# Patient Record
Sex: MALE | URBAN - METROPOLITAN AREA
[De-identification: names, ages, dates, MRNs, and addresses within clinical notes are randomized per-mention and may not be internally consistent; named-entity substitution may affect disease eponyms.]

---

## 2022-09-22 ENCOUNTER — HOSPITAL ENCOUNTER (EMERGENCY)
Facility: MEDICAL CENTER | Age: 22
End: 2022-09-23
Attending: EMERGENCY MEDICINE

## 2022-09-22 ENCOUNTER — APPOINTMENT (OUTPATIENT)
Dept: RADIOLOGY | Facility: MEDICAL CENTER | Age: 22
End: 2022-09-22
Attending: EMERGENCY MEDICINE

## 2022-09-22 DIAGNOSIS — S82.892A CLOSED FRACTURE OF LEFT ANKLE, INITIAL ENCOUNTER: Primary | ICD-10-CM

## 2022-09-22 DIAGNOSIS — S93.05XA DISLOCATION OF LEFT ANKLE JOINT, INITIAL ENCOUNTER: ICD-10-CM

## 2022-09-22 PROCEDURE — 73600 X-RAY EXAM OF ANKLE: CPT | Mod: LT

## 2022-09-22 PROCEDURE — 99285 EMERGENCY DEPT VISIT HI MDM: CPT

## 2022-09-22 RX ORDER — SODIUM CHLORIDE 9 MG/ML
500 INJECTION, SOLUTION INTRAVENOUS
Status: CANCELLED | OUTPATIENT
Start: 2022-09-22 | End: 2022-09-23

## 2022-09-22 RX ORDER — PROPOFOL 10 MG/ML
200 INJECTION, EMULSION INTRAVENOUS ONCE
Status: COMPLETED | OUTPATIENT
Start: 2022-09-23 | End: 2022-09-23

## 2022-09-22 RX ORDER — ACETAMINOPHEN 500 MG
1000 TABLET ORAL ONCE
Status: COMPLETED | OUTPATIENT
Start: 2022-09-22 | End: 2022-09-23

## 2022-09-23 ENCOUNTER — APPOINTMENT (OUTPATIENT)
Dept: RADIOLOGY | Facility: MEDICAL CENTER | Age: 22
End: 2022-09-23
Attending: EMERGENCY MEDICINE

## 2022-09-23 VITALS
RESPIRATION RATE: 23 BRPM | TEMPERATURE: 98 F | WEIGHT: 148 LBS | BODY MASS INDEX: 21.92 KG/M2 | OXYGEN SATURATION: 100 % | SYSTOLIC BLOOD PRESSURE: 125 MMHG | HEART RATE: 71 BPM | DIASTOLIC BLOOD PRESSURE: 77 MMHG | HEIGHT: 69 IN

## 2022-09-23 PROCEDURE — 27840 TREAT ANKLE DISLOCATION: CPT

## 2022-09-23 PROCEDURE — 700102 HCHG RX REV CODE 250 W/ 637 OVERRIDE(OP): Performed by: EMERGENCY MEDICINE

## 2022-09-23 PROCEDURE — 73610 X-RAY EXAM OF ANKLE: CPT | Mod: LT

## 2022-09-23 PROCEDURE — 99152 MOD SED SAME PHYS/QHP 5/>YRS: CPT

## 2022-09-23 PROCEDURE — A9270 NON-COVERED ITEM OR SERVICE: HCPCS | Performed by: EMERGENCY MEDICINE

## 2022-09-23 PROCEDURE — 700111 HCHG RX REV CODE 636 W/ 250 OVERRIDE (IP): Performed by: EMERGENCY MEDICINE

## 2022-09-23 RX ORDER — PROPOFOL 10 MG/ML
INJECTION, EMULSION INTRAVENOUS
Status: COMPLETED | OUTPATIENT
Start: 2022-09-23 | End: 2022-09-23

## 2022-09-23 RX ADMIN — PROPOFOL 200 MG: 10 INJECTION, EMULSION INTRAVENOUS at 00:00

## 2022-09-23 RX ADMIN — ACETAMINOPHEN 1000 MG: 500 TABLET ORAL at 01:00

## 2022-09-23 RX ADMIN — PROPOFOL 20 MG: 10 INJECTION, EMULSION INTRAVENOUS at 00:21

## 2022-09-23 RX ADMIN — PROPOFOL 20 MG: 10 INJECTION, EMULSION INTRAVENOUS at 00:28

## 2022-09-23 RX ADMIN — PROPOFOL 20 MG: 10 INJECTION, EMULSION INTRAVENOUS at 00:23

## 2022-09-23 RX ADMIN — IBUPROFEN 800 MG: 600 TABLET ORAL at 00:59

## 2022-09-23 RX ADMIN — PROPOFOL 20 MG: 10 INJECTION, EMULSION INTRAVENOUS at 00:22

## 2022-09-23 RX ADMIN — PROPOFOL 50 MG: 10 INJECTION, EMULSION INTRAVENOUS at 00:30

## 2022-09-23 RX ADMIN — PROPOFOL 30 MG: 10 INJECTION, EMULSION INTRAVENOUS at 00:25

## 2022-09-23 RX ADMIN — PROPOFOL 40 MG: 10 INJECTION, EMULSION INTRAVENOUS at 00:20

## 2022-09-23 NOTE — ED TRIAGE NOTES
"Chief Complaint   Patient presents with    Ankle Injury     Pt states he was skating and tried to catch himself of his skateboard resulting in a L ankle injury. Pt state immediately after his fall his foot was \"facing the wrong way with toes toward the back so I twisted it back into placed.\" Obvious deformity to L ankle, pulses intact, CMS intact.       Wheelchair with father to triage for above complaint.   Educated on triage process, encourage to inform staff of any changes.     BP (!) 93/60   Pulse 65   Temp 37 °C (98.6 °F) (Temporal)   Resp 18   Ht 1.753 m (5' 9\")   Wt 67.1 kg (148 lb)   SpO2 100%   BMI 21.86 kg/m²     "

## 2022-09-23 NOTE — ED NOTES
Pt brought from lobby to [facility] via wheelchair with dad. Pt placed in hospital gown and is now sitting up in bed with even and unlabored breaths, in no apparent distress at this time. Will continue to monitor pt while awaiting orders.

## 2022-09-23 NOTE — ED NOTES
Report given to Cayetano SANCHEZ RN. At this time pt is resting in bed talking to dad at bedside with even and unlabored breaths, in no acute distress.

## 2022-09-23 NOTE — DISCHARGE INSTRUCTIONS
I want you to keep your ankle elevated.  Please take 1000 mg of Tylenol 3 times a day and 800 mg of Motrin 3 times a day.  You can use the crutches to get around.  You need to call the orthopedic doctor whose name and number I gave you above and make an appointment to be seen so that they can can consider whether you need surgery or not.  If you have any concerns or worsening pain, come back to the ED for further evaluation and treatment.  Thank you for coming in today.

## 2022-09-23 NOTE — ED PROVIDER NOTES
"ED Provider Note    Scribed for Kumar Amaya by Jeremias Cuevas. 9/22/2022  10:54 PM    Primary care provider: None noted  Means of arrival: walk in  History obtained from: Patient  History limited by: None    CHIEF COMPLAINT  Chief Complaint   Patient presents with    Ankle Injury     Pt states he was skating and tried to catch himself of his skateboard resulting in a L ankle injury. Pt state immediately after his fall his foot was \"facing the wrong way with toes toward the back so I twisted it back into placed.\" Obvious deformity to L ankle, pulses intact, CMS intact.     HPI  Ruddy Rose is a 22 y.o. male who presents to the Emergency Department for left ankle pain onset tonight. He states that he was skateboarding when he fell and his ankle turned to the left. He notes that after the injury his ankle was \"facing the wrong way\" and so he twisted it back into place. He states he has no prior injury of this severity to his ankle. He is requesting pain medications for treatment.    Quality: stabbing  Duration: two hours  Severity: moderate  Associated sx: none    REVIEW OF SYSTEMS  As above, all other systems reviewed and are negative.   See HPI for further details.     PAST MEDICAL HISTORY     SURGICAL HISTORY  patient denies any surgical history  SOCIAL HISTORY      Social History     Substance and Sexual Activity   Drug Use None noted     FAMILY HISTORY  None noted  CURRENT MEDICATIONS  Home Medications       Reviewed by Adele Kaufman R.N. (Registered Nurse) on 09/22/22 at 2221  Med List Status: Not Addressed     Medication Last Dose Status        Patient Arturo Taking any Medications                         ALLERGIES  No Known Allergies    PHYSICAL EXAM    VITAL SIGNS:   Vitals:    09/23/22 0025 09/23/22 0030 09/23/22 0035 09/23/22 0100   BP: 112/58 112/55 122/69 125/77   Pulse: 87 72 79 71   Resp: (!) 24 15  (!) 23   Temp:       TempSrc:       SpO2: 99% 99% 100% 100%   Weight:       Height:     "     Vitals: My interpretation: normotensive, not tachycardic, afebrile, not hypoxic    Reinterpretation of vitals: Unchanged    PE:   Constitutional: Well developed, Well nourished, No acute distress, Non-toxic appearance.   HENT: Normocephalic, Atraumatic, Bilateral external ears normal, Oropharynx is clear mucous membranes are moist. No oral exudates or nasal discharge.   Eyes: Pupils are equal round and reactive, EOMI, Conjunctiva normal, No discharge.   Neck: Normal range of motion, No tenderness, Supple, No stridor. No meningismus.  Lymphatic: No lymphadenopathy noted.   Cardiovascular: Regular rate and rhythm without murmur rub or gallop.  Thorax & Lungs: Clear breath sounds bilaterally without wheezes, rhonchi or rales. There is no chest wall tenderness.   Abdomen: Soft non-tender non-distended. There is no rebound or guarding. No organomegaly is appreciated. Bowel sounds are normal.  Skin: Normal without rash.   Back: No CVA or spinal tenderness.   Extremities: Left ankle has obvious deformity, swelling to medial lateral malleolus, tender to touch, posterior tibialis and pupils are intact with a normal capillary refill, decreased range of motion secondary to pain and swelling   musculoskeletal: Good range of motion in all major joints. No tenderness to palpation or major deformities noted.   Neurologic: Alert & oriented x 3, Normal motor function, Normal sensory function, No focal deficits noted. Reflexes are normal.  Psychiatric: Affect normal, Judgment normal, Mood normal. There is no suicidal ideation or patient reported hallucinations.     DIAGNOSTIC STUDIES / PROCEDURES    RADIOLOGY  DX-ANKLE 3+ VIEWS LEFT   Final Result      1.  Anatomic alignment of fracture dislocation of left ankle status post reduction and plaster.      2.  Oblique left distal fibular fracture and small posterior malleolar fracture in anatomic alignment.      DX-ANKLE 2- VIEWS LEFT   Final Result      1.  Oblique fracture of the  "left distal fibula with slight lateral displacement and associated disruption of the ankle mortise.        The radiologist's interpretation of all radiological studies have been reviewed by me.    CONSCIOUS SEDATION PROCEDURE NOTE:  Patient identification was confirmed and patient consent was obtain verbally.  The procedure was conducted at 12:33 AM and performed by Dr. Amaya.  Anesthetic used: propofol   Length of Time: 19 minutes   Other medications administered: None  Pre-procedure neuro examination revealed no deficits. Patient's airway remained patent, O2SAT adequate through procedure. Vitals remained stable. Patient tolerated procedure well without complications. Post-procedure exam showed patient w/o cranial deficits. Sensory and motor intact. Patient returned to baseline prior to disposition.  Instructions for care discussed verbally and pt provided with additional written instructions for homecare and f/u.    Fracture Reduction        Performed by: Kumar Amaya MD      Consent: Verbal consent obtained.      Risks and benefits: risks, benefits and alternatives were discussed      Consent given by: patient and/or guardian      Patient understanding: patient/guardian states understanding of the procedure being performed      Patient consent: the patient/guardian's understanding of the procedure matches consent given      Patient identity confirmed: verbally with patient and arm band      Time out: Immediately prior to procedure a \"time out\" was called to verify the correct patient, procedure, equipment, support staff and site/side marked as required.      Location details: Left ankle fracture dislocation      Reduction Procedure: axial pull, traction and closed manipulation      Results: Post reduction alignement improved      Complication: Tolerated well without complication. Tendons intact and neurovascularly intact post procedure.    Splint Application and Check        Authorized by: Kumar" "Jose Luis       Consent: Verbal consent obtained.      Risks and benefits: risks, benefits and alternatives were discussed      Consent given by: patient      Patient understanding: patient states understanding of the procedure being performed      Patient consent: the patient's understanding of the procedure matches consent given      Patient identity confirmed: verbally with patient and arm band      Time out: Immediately prior to procedure a \"time out\" was called to verify the correct patient, procedure, equipment, support staff and site/side marked as required.      Location details: Left ankle      Post-procedure: The splinted body part was neurovascularly unchanged following the procedure.      Patient tolerance: Patient tolerated the procedure well with no immediate complications.    COURSE & MEDICAL DECISION MAKING  Nursing notes, VS, PMSFHx, labs, imaging, EKG reviewed in chart.    MDM: 10:54 PM Ruddy Rose is a 22 y.o. male who presented with left ankle fracture with likely dislocation in the field.  Patient was skateboarding, rolled his ankle, states that it was completely dislocated and he pushed it back immediately.  Upon arrival here he has diffuse swelling, pain but neurovascular intact with good sensation, strong pulses and normal capillary refill.  X-ray shows oblique fracture of the left distal fibula with slight lateral displacement and associated disruption of the ankle mortise requiring reduction.  Patient was sedated with propofol after he gave consent.  See procedure note.  He underwent fracture reduction and splinting.  Postreduction x-ray shows improved alignment.  Patient provided crutches, Tylenol, Motrin, ice, elevation instructions and follow-up with orthopedic surgery for further evaluation treatment.  He verbalized understanding strict return precautions outpatient follow-up plan and is amenable.    FINAL IMPRESSION  1. Closed fracture of left ankle, initial encounter Acute   2. " Dislocation of left ankle joint, initial encounter Acute      Jeremias CHOW (Scribe), am scribing for, and in the presence of, Kumar Amaya.    Electronically signed by: Jeremias Cuevas (Maria Dibe), 9/22/2022    IKumar personally performed the services described in this documentation, as scribed by Jeremias Cuevas in my presence, and it is both accurate and complete.    The note accurately reflects work and decisions made by me.  Kumar Amaya  9/23/2022  1:02 AM

## 2022-10-04 PROBLEM — S82.62XA CLOSED FRACTURE OF LEFT LATERAL MALLEOLUS: Status: ACTIVE | Noted: 2022-10-04

## 2022-10-10 PROBLEM — S82.62XA DISP FX OF LATERAL MALLEOLUS OF LEFT FIBULA, INIT: Status: ACTIVE | Noted: 2022-10-10
